# Patient Record
Sex: FEMALE | Race: WHITE
[De-identification: names, ages, dates, MRNs, and addresses within clinical notes are randomized per-mention and may not be internally consistent; named-entity substitution may affect disease eponyms.]

---

## 2020-04-26 ENCOUNTER — HOSPITAL ENCOUNTER (EMERGENCY)
Dept: HOSPITAL 57 - BURERS | Age: 40
Discharge: HOME | End: 2020-04-26
Payer: COMMERCIAL

## 2020-04-26 DIAGNOSIS — S70.02XA: ICD-10-CM

## 2020-04-26 DIAGNOSIS — S60.221A: ICD-10-CM

## 2020-04-26 DIAGNOSIS — I10: ICD-10-CM

## 2020-04-26 DIAGNOSIS — S50.12XA: Primary | ICD-10-CM
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
## 2020-04-26 DIAGNOSIS — Z79.899: ICD-10-CM

## 2020-04-26 NOTE — RAD
RIGHT HAND 3 VIEWS:

 

DATE: 4/26/2020.

 

FINDINGS: 

No acute fracture was seen.  There is a raven of bone at the tip of the ulnar styloid process that se
ems more likely due to an old injury than acute one.  The carpal relationships appear normal and no f
ractures were appreciated in the metacarpals, carpals, or digits.

 

IMPRESSION: 

No acute findings.

 

POS: HOME

## 2023-05-10 ENCOUNTER — HOSPITAL ENCOUNTER (OUTPATIENT)
Dept: HOSPITAL 92 - BICMAMMO | Age: 43
Discharge: HOME | End: 2023-05-10
Attending: PHYSICIAN ASSISTANT
Payer: COMMERCIAL

## 2023-05-10 DIAGNOSIS — Z12.31: Primary | ICD-10-CM

## 2023-05-10 PROCEDURE — 77067 SCR MAMMO BI INCL CAD: CPT

## 2023-05-10 PROCEDURE — 77063 BREAST TOMOSYNTHESIS BI: CPT

## 2024-05-30 ENCOUNTER — HOSPITAL ENCOUNTER (OUTPATIENT)
Dept: HOSPITAL 92 - BICMAMMO | Age: 44
Discharge: HOME | End: 2024-05-30
Attending: NURSE PRACTITIONER
Payer: COMMERCIAL

## 2024-05-30 DIAGNOSIS — N63.25: Primary | ICD-10-CM

## 2024-05-30 PROCEDURE — G0279 TOMOSYNTHESIS, MAMMO: HCPCS
